# Patient Record
Sex: FEMALE | Race: AMERICAN INDIAN OR ALASKA NATIVE
[De-identification: names, ages, dates, MRNs, and addresses within clinical notes are randomized per-mention and may not be internally consistent; named-entity substitution may affect disease eponyms.]

---
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
## 2020-03-05 ENCOUNTER — HOSPITAL ENCOUNTER (EMERGENCY)
Dept: HOSPITAL 5 - ED | Age: 20
Discharge: LEFT BEFORE DISCHARGE | End: 2020-03-05
Payer: SELF-PAY

## 2020-03-05 VITALS — SYSTOLIC BLOOD PRESSURE: 116 MMHG | DIASTOLIC BLOOD PRESSURE: 86 MMHG

## 2020-03-05 DIAGNOSIS — F41.1: Primary | ICD-10-CM

## 2020-03-05 DIAGNOSIS — Z79.899: ICD-10-CM

## 2020-03-05 DIAGNOSIS — F17.200: ICD-10-CM

## 2020-03-05 PROCEDURE — 99282 EMERGENCY DEPT VISIT SF MDM: CPT

## 2020-03-05 PROCEDURE — 93005 ELECTROCARDIOGRAM TRACING: CPT

## 2020-03-05 PROCEDURE — 93010 ELECTROCARDIOGRAM REPORT: CPT

## 2020-03-05 NOTE — EMERGENCY DEPARTMENT REPORT
ED Anxiety HPI





- General


Chief Complaint: Anxiety


Stated Complaint: ANXIETY


Time Seen by Provider: 03/05/20 11:14


Source: patient


Mode of arrival: Ambulatory





- History of Present Illness


Initial Comments: 





This is a 19-year-old female nontoxic, well in appearance with no signs of 

distress presents for acute on chronic intermittent generalized anxiety.  

Patient stated was at work and started to become hot and heart palpating fast.  

Timothy batista denies any symptoms and stated symptoms has resolved since 

coming to the ED.  stated her boss instructed her to come to the ED. Patient 

denies any neck pain or back pains. Patient denies loss of consciousness, head 

trauma, ecchymosis, chest pain, short of breath, headache, blurry vision, fever,

chills, stiff neck, decreased range of motion, bladder or bowel instability, 

diaphoresis, nausea, vomiting, abdominal pain, joint pain or swelling, visual 

changes, chest wall tenderness, numbness or tingling sensation extremity. Pat

ient denies any SI/HI.  Denies any depression. Patient denies any allergies.





MD Complaint: anxiety


-: year(s)


Symptoms: palpitations


Place: work


Previous History of Same: Yes


Severity: mild


Quality: intermittant, improving


Provoking factors: none known


Improves With: nothing


Worsens With: nothing


Associated symptoms: denies other symptoms.  denies: chest pain, shortness of 

breath, palpitations, diaphoresis, confusion, cough, fever/chills, headaches, 

anorexia, malaise, nausea/vomiting, rash, seizure, syncope, weakness





- Related Data


Home Medications: 


                                  Previous Rx's











 Medication  Instructions  Recorded  Last Taken  Type


 


HYDROcodone/APAP 5-325 [[place] 1 each PO Q6HR PRN #14 tablet 04/20/17 Unknown Rx





5/325]    











Allergies/Adverse Reactions: 


                                    Allergies











Allergy/AdvReac Type Severity Reaction Status Date / Time


 


No Known Allergies Allergy   Verified 03/05/20 10:57














ED Review of Systems


ROS: 


Stated complaint: ANXIETY


Other details as noted in HPI





Constitutional: denies: chills, fever


Eyes: denies: eye pain, eye discharge, vision change


ENT: denies: ear pain, throat pain


Respiratory: denies: cough, shortness of breath, wheezing


Cardiovascular: denies: chest pain, palpitations


Endocrine: no symptoms reported


Gastrointestinal: denies: abdominal pain, nausea, diarrhea


Genitourinary: denies: urgency, dysuria, discharge


Musculoskeletal: denies: back pain, joint swelling, arthralgia


Skin: denies: rash, lesions


Neurological: denies: headache, weakness, paresthesias


Psychiatric: denies: anxiety, depression


Hematological/Lymphatic: denies: easy bleeding, easy bruising





ED Past Medical Hx





- Past Medical History


Additional medical history: Syncope





- Social History


Smoking Status: Current Every Day Smoker


Substance Use Type: None





- Medications


Home Medications: 


                                Home Medications











 Medication  Instructions  Recorded  Confirmed  Last Taken  Type


 


HYDROcodone/APAP 5-325 [[place] 1 each PO Q6HR PRN #14 tablet 04/20/17  Unknown Rx





5/325]     














ED Physical Exam





- General


Limitations: No Limitations


General appearance: alert, in no apparent distress





- Head


Head exam: Present: atraumatic, normocephalic





- Eye


Eye exam: Present: normal appearance





- Neck


Neck exam: Present: normal inspection, full ROM.  Absent: tenderness, 

meningismus, lymphadenopathy





- Respiratory


Respiratory exam: Present: normal lung sounds bilaterally.  Absent: respiratory 

distress, wheezes, rales, rhonchi, stridor, chest wall tenderness, accessory 

muscle use, decreased breath sounds, prolonged expiratory





- Cardiovascular


Cardiovascular Exam: Present: regular rate, normal rhythm, normal heart sounds. 

Absent: bradycardia, tachycardia, irregular rhythm, systolic murmur, diastolic 

murmur, rubs, gallop





- GI/Abdominal


GI/Abdominal exam: Present: soft, normal bowel sounds.  Absent: distended, tende

rness, guarding, rebound, rigid, diminished bowel sounds





- Extremities Exam


Extremities exam: Present: normal inspection, full ROM





- Back Exam


Back exam: Present: normal inspection, full ROM





- Neurological Exam


Neurological exam: Present: alert, oriented X3, normal gait





- Psychiatric


Psychiatric exam: Present: normal affect, normal mood.  Absent: depressed, 

agitated, anxious, flat affect, manic, homicidal ideation, suicidal ideation





- Skin


Skin exam: Present: warm, dry, intact, normal color.  Absent: rash





ED Course





                                   Vital Signs











  03/05/20





  11:02


 


Temperature 97.8 F


 


Pulse Rate 66


 


Respiratory 20





Rate 


 


Blood Pressure 116/86


 


O2 Sat by Pulse 99





Oximetry 














- Reevaluation(s)


Reevaluation #1: 





03/05/20 11:33


Patient is speaking in full sentences with no signs of distress noted.





ED Medical Decision Making





- EKG Data


Interpretation: normal EKG, other (no ST abnormailitis.  Signed by MD.)





- Medical Decision Making





19-year-old female that presents with anxiety.  Patient is stable and was 

examined by me.  EKG normal.  No SI/HI.   Stated symptoms has resolved by her 

boss told her to come to the ED.  Vital signs stable.  HEART, [place] and

SUN[place] score 0 points.  Patient was instructed to Follow-up with a primary care 

doctor in 3-5 days or if symptoms worsen and continue return to emergency room 

as soon as possible.  At time of discharge, the patient does not seem toxic or 

ill in appearance.  No acute signs of distress noted.  Patient agrees to 

discharge treatment plan of care.  No further questions noted by the patient.





Critical care attestation.: 


If time is entered above; I have spent that time in minutes in the direct care 

of this critically ill patient, excluding procedure time.








ED Disposition


Clinical Impression: 


 Anxiety





Disposition: Z-07 MED SCREENING EXAM-LEFT


Is pt being admited?: No


Does the pt Need Aspirin: No


Condition: Stable


Instructions:  Anxiety (ED)


Additional Instructions: 


Follow-up with a primary care doctor in 3-5 days or if symptoms worsen and 

continue return to emergency room as soon as possible.


Referrals: 


PRIMARY MD HORACIO [Primary Care Provider] - 3-5 Days


QUIN TORRES MD [Staff Physician] - 3-5 Days


[facility] [Outside] - 3-5 Days